# Patient Record
Sex: MALE | Race: WHITE | Employment: FULL TIME | URBAN - METROPOLITAN AREA
[De-identification: names, ages, dates, MRNs, and addresses within clinical notes are randomized per-mention and may not be internally consistent; named-entity substitution may affect disease eponyms.]

---

## 2020-11-17 ENCOUNTER — HOSPITAL ENCOUNTER (EMERGENCY)
Facility: CLINIC | Age: 30
Discharge: HOME | End: 2020-11-17
Attending: PSYCHIATRY & NEUROLOGY | Admitting: PSYCHIATRY & NEUROLOGY
Payer: COMMERCIAL

## 2020-11-17 VITALS
DIASTOLIC BLOOD PRESSURE: 107 MMHG | SYSTOLIC BLOOD PRESSURE: 148 MMHG | HEART RATE: 75 BPM | TEMPERATURE: 95.4 F | OXYGEN SATURATION: 100 %

## 2020-11-17 DIAGNOSIS — F33.9 RECURRENT MAJOR DEPRESSION (H): ICD-10-CM

## 2020-11-17 DIAGNOSIS — F41.1 GENERALIZED ANXIETY DISORDER: ICD-10-CM

## 2020-11-17 DIAGNOSIS — F43.22 ADJUSTMENT DISORDER WITH ANXIOUS MOOD: ICD-10-CM

## 2020-11-17 PROCEDURE — 90791 PSYCH DIAGNOSTIC EVALUATION: CPT

## 2020-11-17 PROCEDURE — 99283 EMERGENCY DEPT VISIT LOW MDM: CPT | Performed by: PSYCHIATRY & NEUROLOGY

## 2020-11-17 PROCEDURE — 99285 EMERGENCY DEPT VISIT HI MDM: CPT | Mod: 25

## 2020-11-17 ASSESSMENT — ENCOUNTER SYMPTOMS
NEUROLOGICAL NEGATIVE: 1
RESPIRATORY NEGATIVE: 1
CONSTITUTIONAL NEGATIVE: 1
GASTROINTESTINAL NEGATIVE: 1
NERVOUS/ANXIOUS: 1
DYSPHORIC MOOD: 1
MUSCULOSKELETAL NEGATIVE: 1
CARDIOVASCULAR NEGATIVE: 1
EYES NEGATIVE: 1

## 2020-11-17 NOTE — ED AVS SNAPSHOT
[facility] Emergency Department  [address]  [place] [address]  Phone: [phone]  Fax: [phone]                                    Sadi Sena   MRN: [id number]    Department: [facility] Emergency Department   Date of Visit: 11/17/2020           After Visit Summary Signature Page    I have received my discharge instructions, and my questions have been answered. I have discussed any challenges I see with this plan with the nurse or doctor.    ..........................................................................................................................................  Patient/Patient Representative Signature      ..........................................................................................................................................  Patient Representative Print Name and Relationship to Patient    ..................................................               ................................................  Date                                   Time    ..........................................................................................................................................  Reviewed by Signature/Title    ...................................................              ..............................................  Date                                               Time          22EPIC Rev 08/18

## 2020-11-17 NOTE — ED TRIAGE NOTES
Pt states hx of anxiety and depression with increasing feeling of being overwhelmed at work.  Pt states having SI thoughts but denies any attempt.

## 2020-11-17 NOTE — ED NOTES
I have performed an in person assessment of the patient. Based on this assessment the patient no longer requires a one on one attendant at this point in time.    Roberto Carlos Garcia MD  5:22 PM  November 17, 2020         Roberto Carlos Garcia MD  11/17/20 [id number]

## 2020-11-18 NOTE — DISCHARGE INSTRUCTIONS
I am glad you are feeling better.  Please follow-up with therapy next week to start being healthy coping skills and resilience  Follow-up with a medication provider tomorrow to determine appropriate meds to manage your anxiety and ongoing monitoring

## 2020-11-18 NOTE — ED PROVIDER NOTES
ED Provider Note  [facility]      History     Chief Complaint   Patient presents with     Suicidal     HPI  Sadi Sena is a 30 year old male who has history of anxiety and panic symptoms. He sought therapy previously which helped and shortly stopped going. He tried hydroxyzine which also helped and shortly stopped taking it when he felt better. Patient admits to being adverse to medications. He has been feeling stressed and overwhelmed. He reports smoking THC 4 times a week to self-medicate. He now felt his anxiety is not going away and decided to schedule a therapy appointment for next week. Patient however was feeling emotionally dysregulated and called the suicide hotline as he was having passive SI. He was told to come to the Ed for further evaluation.    Patient started to feel better as he was talking to the . He now feels in emotional control and safe to go home. He agreed to getting a referral for a med provider that is set up for tomorrow. Patient does not exhibit psychosis. He appears calm and in emotional control here.    Please see [facility] Crisis Assessment on 11/17/2020 in Epic for further details.    PERSONAL MEDICAL HISTORY  No past medical history on file.  PAST SURGICAL HISTORY  No past surgical history on file.  FAMILY HISTORY  No family history on file.  SOCIAL HISTORY  Social History     Tobacco Use     Smoking status: Not on file   Substance Use Topics     Alcohol use: Not on file     MEDICATIONS  No current facility-administered medications for this encounter.      No current outpatient medications on file.     ALLERGIES  Allergies   Allergen Reactions     Cefixime      Sulfa Drugs           Review of Systems   Constitutional: Negative.    HENT: Negative.    Eyes: Negative.    Respiratory: Negative.    Cardiovascular: Negative.    Gastrointestinal: Negative.    Genitourinary: Negative.    Musculoskeletal: Negative.    Skin: Negative.    Neurological:  Negative.    Psychiatric/Behavioral: Positive for dysphoric mood and suicidal ideas. The patient is nervous/anxious.    All other systems reviewed and are negative.        Physical Exam   BP: (!) 148/107  Pulse: 75  Temp: 95.4  F (35.2  C)  SpO2: 100 %  Physical Exam  Vitals signs and nursing note reviewed.   HENT:      Head: Normocephalic.   Eyes:      Pupils: Pupils are equal, round, and reactive to light.   Neck:      Musculoskeletal: Normal range of motion.   Pulmonary:      Effort: Pulmonary effort is normal.   Musculoskeletal: Normal range of motion.   Neurological:      Mental Status: He is alert.   Psychiatric:         Attention and Perception: Attention and perception normal. He does not perceive auditory or visual hallucinations.         Mood and Affect: Affect normal. Mood is anxious.         Speech: Speech normal.         Behavior: Behavior normal. Behavior is not agitated, aggressive, hyperactive or combative. Behavior is cooperative.         Thought Content: Thought content normal. Thought content is not paranoid. Thought content does not include homicidal or suicidal ideation.         Cognition and Memory: Cognition and memory normal.         Judgment: Judgment normal.         ED Course      Procedures             No results found for any visits on 11/17/20.  Medications - No data to display     Assessments & Plan (with Medical Decision Making)   Patient with history of anxiety who has had a recurrence and feels overwhelmed. He started to reconnect with a therapist but symptoms continue to bother him and he called a hotline and was referred here. He now feels much improved and feels safe being home while he waits for his therapy appointment next week. He is encouraged to follow-up with medication provider that was set up for him tomorrow. He is to follow-up established care and services.    I have reviewed the nursing notes. I have reviewed the findings, diagnosis, plan and need for follow up with the  patient.    New Prescriptions    No medications on file       Final diagnoses:   Adjustment disorder with anxious mood       --  Henok Morales MD  [facility] EMERGENCY DEPARTMENT  11/17/2020     Henok Morales MD  11/17/20 [id number]